# Patient Record
Sex: MALE | Race: WHITE | NOT HISPANIC OR LATINO | Employment: UNEMPLOYED | URBAN - NONMETROPOLITAN AREA
[De-identification: names, ages, dates, MRNs, and addresses within clinical notes are randomized per-mention and may not be internally consistent; named-entity substitution may affect disease eponyms.]

---

## 2020-10-20 ENCOUNTER — HOSPITAL ENCOUNTER (EMERGENCY)
Facility: HOSPITAL | Age: 70
Discharge: HOME | End: 2020-10-20
Attending: EMERGENCY MEDICINE | Admitting: EMERGENCY MEDICINE

## 2020-10-20 VITALS
SYSTOLIC BLOOD PRESSURE: 130 MMHG | HEIGHT: 73 IN | HEART RATE: 80 BPM | TEMPERATURE: 98 F | OXYGEN SATURATION: 96 % | DIASTOLIC BLOOD PRESSURE: 75 MMHG | BODY MASS INDEX: 24.52 KG/M2 | WEIGHT: 185 LBS | RESPIRATION RATE: 18 BRPM

## 2020-10-20 DIAGNOSIS — Z20.822 EXPOSURE TO COVID-19 VIRUS: Primary | ICD-10-CM

## 2020-10-20 LAB — SARS-COV-2 RDRP RESP QL NAA+PROBE: NOT DETECTED

## 2020-10-20 PROCEDURE — 99283 EMERGENCY DEPT VISIT LOW MDM: CPT

## 2020-10-20 PROCEDURE — C9803 HOPD COVID-19 SPEC COLLECT: HCPCS

## 2020-10-20 PROCEDURE — 87635 SARS-COV-2 COVID-19 AMP PRB: CPT | Performed by: EMERGENCY MEDICINE

## 2020-10-20 NOTE — DISCHARGE INSTRUCTIONS
Follow-up with your family doctor in 2 days for recheck.  Return to the emergency department symptoms worsen/any problems.

## 2020-10-20 NOTE — ED NOTES
Patient alert and oriented at time of discharge. Patient verbalized understanding of follow up care and how to quarantine. Patient verbalized he would return if symptoms worsen.      Matty Sumner RN  10/20/20 8333

## 2020-10-20 NOTE — ED PROVIDER NOTES
Subjective   70-year-old white male presents for Covid exposure.  Patient states that his boss tested positive for Covid yesterday.  Patient had spent quite a bit of time with him 2 to 3 days prior to this.  Currently, the patient has no acute complaints.  He said he occasionally has some cough and sneezing, but has allergies and this is his baseline.  He denies any chest pain, shortness of breath or other complaints.          Review of Systems   All other systems reviewed and are negative.      No past medical history on file.    No Known Allergies    No past surgical history on file.    No family history on file.    Social History     Socioeconomic History   • Marital status:      Spouse name: Not on file   • Number of children: Not on file   • Years of education: Not on file   • Highest education level: Not on file           Objective   Physical Exam  Vitals signs and nursing note reviewed.   Constitutional:       Appearance: Normal appearance.   HENT:      Head: Normocephalic and atraumatic.   Cardiovascular:      Rate and Rhythm: Normal rate and regular rhythm.      Heart sounds: No murmur. No friction rub. No gallop.    Pulmonary:      Effort: Pulmonary effort is normal.      Breath sounds: Normal breath sounds. No wheezing, rhonchi or rales.   Abdominal:      General: Abdomen is flat. Bowel sounds are normal. There is no distension.      Palpations: Abdomen is soft.      Tenderness: There is no abdominal tenderness.   Musculoskeletal: Normal range of motion.   Skin:     General: Skin is warm and dry.   Neurological:      General: No focal deficit present.      Mental Status: He is alert and oriented to person, place, and time.   Psychiatric:         Mood and Affect: Mood normal.         Behavior: Behavior normal.         Procedures  Results for orders placed or performed during the hospital encounter of 10/20/20   COVID-19, [facility] IN-HOUSE,NP Swab (NO TRANSPORT MEDIA) 2 HR TAT - Swab, Nasopharynx     Specimen: Nasopharynx; Swab   Result Value Ref Range    COVID19 Not Detected Not Detected - Ref. Range              ED Course  ED Course as of Dec 03 0804   Tue Oct 20, 2020   0713 Endorsed to Dr. Esposito at shift change.    [BC]   0738 I assumed patient's care from Dr. Mitchell this morning shift change, pending Covid result.  Please see his documentation for details.  Patient's emergency department stay has been uneventful.  Never has he shown any signs of distress.    [CM]      ED Course User Index  [BC] Sukhdev Mitchell MD  [CM] Shashi Esposito MD                                           MDM  Number of Diagnoses or Management Options  Exposure to COVID-19 virus:   Risk of Complications, Morbidity, and/or Mortality  Presenting problems: moderate  Diagnostic procedures: low  Management options: low        Final diagnoses:   Exposure to COVID-19 virus            Sukhdev Mitchell MD  10/20/20 2120       Sukhdev Mitchell MD  12/03/20 0804

## 2020-12-10 ENCOUNTER — HOSPITAL ENCOUNTER (EMERGENCY)
Facility: HOSPITAL | Age: 70
Discharge: HOME | End: 2020-12-10
Attending: STUDENT IN AN ORGANIZED HEALTH CARE EDUCATION/TRAINING PROGRAM | Admitting: STUDENT IN AN ORGANIZED HEALTH CARE EDUCATION/TRAINING PROGRAM

## 2020-12-10 VITALS
WEIGHT: 185 LBS | HEART RATE: 82 BPM | BODY MASS INDEX: 24.52 KG/M2 | SYSTOLIC BLOOD PRESSURE: 111 MMHG | RESPIRATION RATE: 18 BRPM | DIASTOLIC BLOOD PRESSURE: 66 MMHG | OXYGEN SATURATION: 94 % | HEIGHT: 73 IN | TEMPERATURE: 98.7 F

## 2020-12-10 DIAGNOSIS — M10.9 ACUTE GOUT INVOLVING TOE OF LEFT FOOT, UNSPECIFIED CAUSE: Primary | ICD-10-CM

## 2020-12-10 PROCEDURE — 63710000001 PREDNISONE PER 1 MG: Performed by: STUDENT IN AN ORGANIZED HEALTH CARE EDUCATION/TRAINING PROGRAM

## 2020-12-10 PROCEDURE — 99283 EMERGENCY DEPT VISIT LOW MDM: CPT

## 2020-12-10 RX ORDER — COLCHICINE 0.6 MG/1
0.6 TABLET ORAL ONCE
Status: COMPLETED | OUTPATIENT
Start: 2020-12-10 | End: 2020-12-10

## 2020-12-10 RX ORDER — IBUPROFEN 400 MG/1
800 TABLET ORAL ONCE
Status: COMPLETED | OUTPATIENT
Start: 2020-12-10 | End: 2020-12-10

## 2020-12-10 RX ORDER — PREDNISONE 20 MG/1
60 TABLET ORAL ONCE
Status: COMPLETED | OUTPATIENT
Start: 2020-12-10 | End: 2020-12-10

## 2020-12-10 RX ORDER — PREDNISONE 50 MG/1
50 TABLET ORAL DAILY
Qty: 5 TABLET | Refills: 0 | Status: SHIPPED | OUTPATIENT
Start: 2020-12-10 | End: 2020-12-15

## 2020-12-10 RX ADMIN — PREDNISONE 60 MG: 20 TABLET ORAL at 04:06

## 2020-12-10 RX ADMIN — COLCHICINE 0.6 MG: 0.6 TABLET, FILM COATED ORAL at 04:05

## 2020-12-10 RX ADMIN — IBUPROFEN 800 MG: 400 TABLET ORAL at 04:04

## 2020-12-10 NOTE — ED PROVIDER NOTES
Subjective   Is a 70-year-old male coming for evaluation of left great toe pain.  Patient is a history of gout and states this feels similar to his past gout flares.  Patient took Motrin earlier this evening at home and also took colchicine once from a friend but does not currently have a prescription for it.  Patient states he started having symptoms 3 days ago believe he started taking medications too late and his pain is worse than normal so wanted come to the ER for evaluation.  No fevers or other symptoms.  Has had multiple flares of gout in this toe before.  Left great toe.  Describes a strong sensation.  No recent cuts or lacerations.  Able to move toe but has pain in that area.      History provided by:  Patient  Toe Pain  Location:  Left great toe  Quality:  Pain  Severity:  Moderate  Onset quality:  Gradual  Duration:  3 days  Timing:  Constant  Progression:  Worsening  Chronicity:  Recurrent  Associated symptoms: no abdominal pain, no chest pain, no cough, no diarrhea, no fatigue, no fever, no headaches, no myalgias, no nausea, no rash, no shortness of breath, no sore throat and no vomiting        Review of Systems   Constitutional: Negative for fatigue and fever.   HENT: Negative for sore throat and trouble swallowing.    Eyes: Negative for pain and redness.   Respiratory: Negative for cough and shortness of breath.    Cardiovascular: Negative for chest pain and palpitations.   Gastrointestinal: Negative for abdominal pain, diarrhea, nausea and vomiting.   Genitourinary: Negative for dysuria, flank pain and hematuria.   Musculoskeletal: Positive for joint swelling. Negative for back pain and myalgias.   Skin: Negative for rash and wound.   Neurological: Negative for seizures and headaches.   Psychiatric/Behavioral: Negative for hallucinations and suicidal ideas.     PMH:gout, gerd  PSH:chepe ortiz  All: NKDA  Meds: epcid  Shx: Denies daily alcohol or drug use    History reviewed. No pertinent past  medical history.    No Known Allergies    History reviewed. No pertinent surgical history.    History reviewed. No pertinent family history.    Social History     Socioeconomic History   • Marital status:      Spouse name: Not on file   • Number of children: Not on file   • Years of education: Not on file   • Highest education level: Not on file           Objective   Physical Exam  Vitals signs and nursing note reviewed.   Constitutional:       General: He is not in acute distress.     Appearance: He is not ill-appearing or diaphoretic.   HENT:      Head: Normocephalic and atraumatic.      Nose: Nose normal.   Eyes:      Conjunctiva/sclera: Conjunctivae normal.   Neck:      Musculoskeletal: Normal range of motion. No muscular tenderness.   Cardiovascular:      Rate and Rhythm: Normal rate and regular rhythm.      Pulses: Normal pulses.   Pulmonary:      Effort: Pulmonary effort is normal. No respiratory distress.      Breath sounds: Normal breath sounds. No wheezing.   Abdominal:      Palpations: Abdomen is soft.      Tenderness: There is no abdominal tenderness.   Musculoskeletal: Normal range of motion.         General: No deformity.      Comments: Mild erythema and swelling to left great toe, mildly warm to touch, able to range without difficulty   Skin:     Capillary Refill: Capillary refill takes less than 2 seconds.      Findings: No rash.   Neurological:      General: No focal deficit present.      Mental Status: He is alert and oriented to person, place, and time.      Motor: No weakness.      Gait: Gait normal.   Psychiatric:         Mood and Affect: Mood normal.         Procedures           ED Course  ED Course as of Dec 10 0509   Thu Dec 10, 2020   0507 Patient symptoms significantly improved after medications.  Was able ambulate well on his foot.  Has good passive and active range of motion of great toe.  Again discussed low likelihood of infection with patient.  He was comfortable with outpatient  treatment at this time with pain medication and stated he will return to the ER if he had any fevers or other changes unusual for his gout flares.  Discussed follow-up with primary care doctor in the next few days and strict return precautions to the ER.  Patient understood and agreed to plan.    [JA]      ED Course User Index  [JA] Lars Willingham MD                                           MDM  Number of Diagnoses or Management Options  Acute gout involving toe of left foot, unspecified cause: new and requires workup  Risk of Complications, Morbidity, and/or Mortality  Presenting problems: low  Diagnostic procedures: low  Management options: low    Patient Progress  Patient progress: improved    DDX: gout, infection, inflammation, gout flare    Plan:  70 -year-old male history of gout coming in with left great toe pain.  Patient is nontoxic well-appearing on exam with stable vital signs.  Patient has mild swelling around the toe but has good range of motion of the toe.  Given history of same in the past lower concern for septic joint.  Discussed tach with patient however he stated this felt like previous gout attacks and would like to do conservative management at this time and will return to the ER if any new or changing symptoms.  Will treat with steroids Motrin and small dose colchicine as he took another dose earlier today.  Patient comfortable with plan. Pain improved prior to arrival with medication treatment at home.     Meds given:   Medications   predniSONE (DELTASONE) tablet 60 mg (60 mg Oral Given 12/10/20 0406)   ibuprofen (ADVIL,MOTRIN) tablet 800 mg (800 mg Oral Given 12/10/20 0404)   colchicine tablet 0.6 mg (0.6 mg Oral Given 12/10/20 0405)        Labs: Labs Reviewed - No data to display     Rads:   No orders to display       Interpretation: NA    EKG:NA    Vitals:    12/10/20 0045   BP: 111/66   Pulse: 82   Resp: 18   Temp: 98.7 °F (37.1 °C)   SpO2: 94%   Weight: 83.9 kg (185 lb)   Height: 185.4  "josseline (73\")        Dispo: dc home with steroids, close pcp fu. Discussed strict return precautions.     Final diagnoses:   Acute gout involving toe of left foot, unspecified cause            Lars Willingham MD  12/10/20 0510    "

## 2020-12-10 NOTE — DISCHARGE INSTRUCTIONS
Follow-up with primary care doctor as discussed.  Return to the ER if have worsening pain fevers or extension of redness.